# Patient Record
Sex: MALE | Race: WHITE | NOT HISPANIC OR LATINO | URBAN - METROPOLITAN AREA
[De-identification: names, ages, dates, MRNs, and addresses within clinical notes are randomized per-mention and may not be internally consistent; named-entity substitution may affect disease eponyms.]

---

## 2017-03-29 ENCOUNTER — EMERGENCY (EMERGENCY)
Facility: HOSPITAL | Age: 32
LOS: 1 days | Discharge: HOME | End: 2017-03-29
Admitting: EMERGENCY MEDICINE
Payer: MEDICAID

## 2017-03-29 VITALS
RESPIRATION RATE: 18 BRPM | TEMPERATURE: 98 F | OXYGEN SATURATION: 98 % | HEART RATE: 96 BPM | DIASTOLIC BLOOD PRESSURE: 86 MMHG | SYSTOLIC BLOOD PRESSURE: 137 MMHG
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
## 2017-03-29 DIAGNOSIS — F10.129 ALCOHOL ABUSE WITH INTOXICATION, UNSPECIFIED: ICD-10-CM
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
## 2017-03-29 DIAGNOSIS — R45.851 SUICIDAL IDEATIONS: ICD-10-CM

## 2017-03-29 DIAGNOSIS — S00.33XA CONTUSION OF NOSE, INITIAL ENCOUNTER: ICD-10-CM

## 2017-03-29 PROCEDURE — 70450 CT HEAD/BRAIN W/O DYE: CPT | Mod: 26

## 2017-03-29 PROCEDURE — 70450 CT HEAD/BRAIN W/O DYE: CPT

## 2017-03-29 PROCEDURE — 99284 EMERGENCY DEPT VISIT MOD MDM: CPT | Mod: 25

## 2017-03-29 PROCEDURE — 99285 EMERGENCY DEPT VISIT HI MDM: CPT | Mod: 25

## 2017-03-29 PROCEDURE — 93005 ELECTROCARDIOGRAM TRACING: CPT

## 2017-03-29 PROCEDURE — 70486 CT MAXILLOFACIAL W/O DYE: CPT | Mod: 26

## 2017-03-29 PROCEDURE — 93010 ELECTROCARDIOGRAM REPORT: CPT

## 2017-03-29 PROCEDURE — 70486 CT MAXILLOFACIAL W/O DYE: CPT

## 2017-03-29 NOTE — ED PROVIDER NOTE - PROGRESS NOTE DETAILS
Patient's friend, Ramiro Javed, [phone].  Here with patient.  Verifies that this is out of character for Benito.  When he came upon the scene, ~ 6:30pm, Mr. Dalton was on the floor banging his face against the floor.  patient endorses daily alcohol abuse; last drink was 4pm.  Patient's occupation is . pt appears clinically sober at this time, stating he does not remember saying that he wanted to hurt himself and does not remember hitting his head against the floor to cause his nasal fracture, stating 'what an idiot I was.' He reports he knows where he is and why he was brought here but does not remember his actions. will contact psych at this time with the significance of the concern of his friends, will consult. -Maryann Mitchell PA-C Patient sobering up well. AAOx3. Mother, father, sister at bedside, all of whom willing to take him home with them, if cleared by psyche. Cleared by psych for discharge. - Brayden Shahid, Resident

## 2017-03-29 NOTE — ED ADULT NURSE NOTE - OBJECTIVE STATEMENT
33 y/o M BIBA from home s/p breakup with girlfriend, as per EMS pt's girlfriend reports he has been drinking a bottle of vodka since yesterday, GF called EMS to their home when he was lying on the ground, sobbing, screaming, threatening to hurt himself and hitting his face against the ground. Pt was endorsing SI to EMS upon their arrival. Pt has small scab to nose, denies any pain. Pt presents AAOx3, NAD, sitting up comfortably in bed with friend at bedside, pt denies SI/HI/AV hallucinations at this time, pt calm/cooperative, intermittently tearful, pt wanded by security, in gown, belongings obtained by security, 1:1 in progress, safety maintained.

## 2017-03-29 NOTE — ED PROVIDER NOTE - NS ED ROS FT
Constitutional: No fever or chills  Eyes: No visual changes, eye pain or redness  HEENT: No throat pain, ear pain. +nasal swelling and bruising. No nose bleeding.  CV: No chest pain or lower extremity edema  Resp: No SOB no cough  GI: No abd pain. No nausea or vomiting. No diarrhea. No constipation.   : No dysuria, hematuria.   MSK: No musculoskeletal pain  Skin: No rash  Neuro: No headache. No numbness or tingling. No weakness.

## 2017-03-29 NOTE — ED PROVIDER NOTE - PHYSICAL EXAMINATION
patient awake alert NAD . +edema and developing ecchymosis to nose. LUNGS CTAB no wheeze no crackle. CARD RRR no m/r/g.  Abdomen soft NT ND no rebound no guarding no CVA tenderness. EXT WWP no edema no calf tenderness CV 2+DP/PT bilaterally. neuro A&Ox3 gait normal.  skin warm and dry no rash

## 2017-03-29 NOTE — ED ADULT NURSE NOTE - CHPI ED SYMPTOMS NEG
no pain/no chills/no fever/no disorientation/no nausea/no weakness/no confusion/no abdominal pain/no abdominal distension/no vomiting

## 2017-03-29 NOTE — ED PROVIDER NOTE - ATTENDING CONTRIBUTION TO CARE
MD Mckinney:  patient seen and evaluated with the PA.  I was present for key portions of the History and Physical, and I agree with the Impression and Plan.    MD Mckinney:  33 yo M, bib police after an altercation with GF.  She called police after an argument that ended with him expressing intent to hurt himself.  No specific plan.  Patient currently calm, cooperative, and remorseful.  Reports that he does not want to hurt himself.  Vs - wnl.  Physical Exam: adult M, tearful, +swollen nose, no septal hematoma.   Psyche:  denies SI/HI, no A/V hallucinations.  Ambulates w/o difficulty.  Here with best friend, who, if needed can provide transportation and a place for Benito to stay tonight.  Impression:  ETOH intox + SI, now denying, presently calm & cooperative.  Plan:  ecg, CT head/max/face, reassess.

## 2017-03-29 NOTE — ED PROVIDER NOTE - OBJECTIVE STATEMENT
31 y/o M BIB EMD/police after girlfriend called them during fight/patient intoxicated. Pt present with close friends who provide history as patient is intoxicated at this time. Report patient has been arguing with his girlfriend, 'likely going to break up' for the last 2 days. Pt has 'drank a bottle of vodka between yesterday and today and has been extremely intoxicated.' Girlfriend became concerned when during their argument he became very tearful, threw himself on the ground face down and was hitting his face into the floor.' Girlfriend called his friends stating that patient was saying he wanted to hurt himself and was too drunk for her to take care of. Pt states now that he does not want to hurt himself or others; no plan for hurting himself and has never felt the urge to hurt himself in the past. Denies any psych history, drug abuse, nausea, vomiting. hallucinations.

## 2017-03-29 NOTE — ED PROVIDER NOTE - MEDICAL DECISION MAKING DETAILS
MD Mckinney:  31 yo M, bib police after an altercation with GF.  She called police after an argument that ended with him expressing intent to hurt himself.  No specific plan.  Patient currently calm, cooperative, and remorseful.  Reports that he does not want to hurt himself.  Vs - wnl.  Physical Exam: adult M, tearful, +swollen nose, no septal hematoma.   Psyche:  denies SI/HI, no A/V hallucinations.  Ambulates w/o difficulty.  Here with best friend, who, if needed can provide transportation and a place for Benito to stay tonight.  Impression:  ETOH intox + SI, now denying, presently calm & cooperative.  Plan:  ecg, CT head/max/face, reassess.

## 2017-03-30 VITALS
OXYGEN SATURATION: 98 % | HEART RATE: 90 BPM | SYSTOLIC BLOOD PRESSURE: 109 MMHG | TEMPERATURE: 98 F | DIASTOLIC BLOOD PRESSURE: 71 MMHG | RESPIRATION RATE: 16 BRPM

## 2017-03-30 DIAGNOSIS — F19.94 OTHER PSYCHOACTIVE SUBSTANCE USE, UNSPECIFIED WITH PSYCHOACTIVE SUBSTANCE-INDUCED MOOD DISORDER: ICD-10-CM

## 2017-03-30 DIAGNOSIS — F10.99 ALCOHOL USE, UNSPECIFIED WITH UNSPECIFIED ALCOHOL-INDUCED DISORDER: ICD-10-CM

## 2017-03-30 DIAGNOSIS — R69 ILLNESS, UNSPECIFIED: ICD-10-CM

## 2017-03-30 NOTE — ED BEHAVIORAL HEALTH ASSESSMENT NOTE - DESCRIPTION
Calm, cooperative, maintaining behavioral control Denies Living with girlfriend, no children, has two jobs

## 2017-03-30 NOTE — ED BEHAVIORAL HEALTH ASSESSMENT NOTE - OTHER PAST PSYCHIATRIC HISTORY (INCLUDE DETAILS REGARDING ONSET, COURSE OF ILLNESS, INPATIENT/OUTPATIENT TREATMENT)
Denies past psychiatric history, inpatient admissions, suicide attempts, self-injurious behaviors. Denies past and current outpatient psychiatric follow up.

## 2017-03-30 NOTE — ED BEHAVIORAL HEALTH ASSESSMENT NOTE - SUMMARY
Benito Dalton is a 32 year old  man, single, with no dependents, living with girlfriend, working two jobs (construction/bartending), with no formal past psychiatric history, denies past suicide attempts/inpatient admissions, denies past and current psychiatric follow up, with no reported history of violence/arrests, with history of alcohol use resulting in blackouts, denies seizures/MICU admissions/DTs/DUIs, with no reported past medical history who was BIBEMS activated by girlfriend for patient banging head against floor, making suicidal statements while intoxicated earlier today. At the time of assessment, patient did not appear acutely intoxicated or in acute withdrawal. Patient currently denies suicidal and homicidal ideations, intent and plan with no evidence of self-injurious or violent behaviors in the ED. Presenting complaint in the context of alcohol intoxication also  exacerbated by psychosocial stressors with maladaptive coping skill, which he admits. There is no evidence of florid psychosis or ann. Collateral (girlfriend, parents and sister) all deny acute safety concerns, advocating for discharge. Patient offered voluntary admission, which he declined. He is not an imminent danger to self or others at this time and thus, does not meet criteria for involuntary psychiatric admission. Patient is psychiatrically stable for discharge. Case discussed with attending, Dr. Victoria Carcamo via telephone.

## 2017-03-30 NOTE — ED BEHAVIORAL HEALTH ASSESSMENT NOTE - REFERRAL / APPOINTMENT DETAILS
Patient offered referrals for substance abuse treatment/psychiatric treatment, however, he declined Patient offered referrals for substance abuse treatment/psychiatric treatment, however, he declined/ not accepting contact information.

## 2017-03-30 NOTE — ED BEHAVIORAL HEALTH ASSESSMENT NOTE - SAFETY PLAN DETAILS
Patient, girlfriend and family advised to call 911 or go to the ED if patient has worsening symptoms, reports suicidal/homicidal ideations, intent or plan.

## 2017-03-30 NOTE — ED BEHAVIORAL HEALTH ASSESSMENT NOTE - SUICIDE PROTECTIVE FACTORS
Engaged in work or school/Identifies reasons for living/Future oriented/Supportive social network or family/Responsibility to family and others

## 2017-03-30 NOTE — ED BEHAVIORAL HEALTH ASSESSMENT NOTE - OTHER
girlfriend, Talisha Jin girlfriend financial burdens, interpersonal conflict with girlfriend + alcohol odor sarcastic at times financial burdens, interpersonal conflict with girlfriend (per chart review)

## 2017-03-30 NOTE — ED BEHAVIORAL HEALTH ASSESSMENT NOTE - HPI (INCLUDE ILLNESS QUALITY, SEVERITY, DURATION, TIMING, CONTEXT, MODIFYING FACTORS, ASSOCIATED SIGNS AND SYMPTOMS)
Benito Dalton is a 32 year old  man, single, with no dependents, living with girlfriend, working two jobs (construction/bartending), with no formal past psychiatric history, denies past suicide attempts/inpatient admissions, denies past and current psychiatric follow up, with no reported history of violence/arrests, with history of alcohol use resulting in blackouts, denies seizures/MICU admissions/DTs/DUIs, with no reported past medical history who was BIBEMS activated by girlfriend for patient banging head against floor, making suicidal statements while intoxicated earlier today.    On evaluation, patient does not appear acutely intoxicated or in withdrawal. He reports that he has been "under a lot of stress" recently which has been causing him to drink alcohol more frequently. Patient reports main stressor of financial burdens/paying off student loans. He states that he drinks a few beers "occasionally" (unable to quantify use, frequency)  but reports drinking 1L of vodka earlier today which is reportedly a higher amount of alcohol consumption for him. Patient reports that he drinks to help him feel better and cope with stress. States that he does not recall events leading up to ED admission tonight. Reports that he was informed by his girlfriend that he was loud, laid on the floor, started banging head against the floor and made suicidal statements. Benito Dalton is a 32 year old  man, single, with no dependents, living with girlfriend, working two jobs (construction/bartending), with no formal past psychiatric history, denies past suicide attempts/inpatient admissions, denies past and current psychiatric follow up, with no reported history of violence/arrests, with history of alcohol use resulting in blackouts, denies seizures/MICU admissions/DTs/DUIs, with no reported past medical history who was BIBEMS activated by girlfriend for patient banging head against floor, making suicidal statements while intoxicated earlier today.    On evaluation, patient does not appear acutely intoxicated or in withdrawal. He reports that he has been "under a lot of stress" recently which has been causing him to drink alcohol more frequently. Patient reports main stressor of financial burdens/paying off student loans. He states that he drinks a few beers "occasionally" (unable to quantify use, frequency)  but reports drinking 1L of vodka earlier today which is reportedly a higher amount of alcohol consumption for him. Patient reports that he drinks to help him feel better and cope with stress, which he admits is maladaptive. States that he does not recall events leading up to ED admission tonight. Reports that he was informed by his girlfriend that he became tearful, laid on the floor, started banging head against the ground and stated that he wanted to hurt himself. Patient currently denies suicidal and homicidal ideations, intent and plan. He denies recent suicide attempt. Cites family as major protective factors. Also reports desire to get out of debt. Reports feeling comforted by presence of girlfriend, parents, sister and four best friends at bedside tonight. Patient reports "depressed" mood over the past two days, denies anhedonia, changes in sleep or appetite, impaired concentration, fatigue, hopelessness, inappropriate guilt. He denies anxiety and panic attacks. Denies past and current manic symptoms. Denies paranoia, auditory and visual hallucinations. No delusions elicited.     Collateral: Writer spoke with patient's girlfriend, Talisha Gutierrezon who corroborates above account. She states that the patient has been on an "alcohol binge" over the past two days due to reported stress. Reports that he drinks frequently but she had never seen him become self-injurious or violent before thus, prompting ED admission. Talisha denies patient making suicidal or homicidal statements prior to tonight when he was intoxicated. She denies past and current domestic abuse. States that she feels safe with the patient and denies acute safety concerns currently. Girlfriend, parents and sister  (present at bedside) all deny acute safety concerns,

## 2017-03-30 NOTE — ED BEHAVIORAL HEALTH ASSESSMENT NOTE - DETAILS
Girlfriend, Talisha Jin informed of plan for discharge, in agreement. Denies acute safety concerns. fractured nose, nasal swelling patient  making suicidal statements while intoxicated this evening, denies currently acute nasal fracture, nasal swelling

## 2017-03-30 NOTE — ED BEHAVIORAL HEALTH ASSESSMENT NOTE - DESCRIPTION (FIRST USE, LAST USE, QUANTITY, FREQUENCY, DURATION)
Alcohol use, reports last drink was 1L of vodka earlier this afternoon, reports "social" alcohol use several times daily, less than 1L vodka at a time

## 2017-03-30 NOTE — ED BEHAVIORAL HEALTH ASSESSMENT NOTE - CONSEQUENCES
Reports history of blackouts, last in college "years ago"; denies history of complicated withdrawals

## 2020-12-17 NOTE — ED BEHAVIORAL HEALTH ASSESSMENT NOTE - AXIS III
Prior to immunization administration, verified patients identity using patient s name and date of birth. Please see Immunization Activity for additional information.     Screening Questionnaire for Adult Immunization    Are you sick today?   No   Do you have allergies to medications, food, a vaccine component or latex?   No   Have you ever had a serious reaction after receiving a vaccination?   No   Do you have a long-term health problem with heart, lung, kidney, or metabolic disease (e.g., diabetes), asthma, a blood disorder, no spleen, complement component deficiency, a cochlear implant, or a spinal fluid leak?  Are you on long-term aspirin therapy?   Yes   Do you have cancer, leukemia, HIV/AIDS, or any other immune system problem?   No   Do you have a parent, brother, or sister with an immune system problem?   No   In the past 3 months, have you taken medications that affect  your immune system, such as prednisone, other steroids, or anticancer drugs; drugs for the treatment of rheumatoid arthritis, Crohn s disease, or psoriasis; or have you had radiation treatments?   No   Have you had a seizure, or a brain or other nervous system problem?   No   During the past year, have you received a transfusion of blood or blood    products, or been given immune (gamma) globulin or antiviral drug?   No   For women: Are you pregnant or is there a chance you could become       pregnant during the next month?   No   Have you received any vaccinations in the past 4 weeks?   No     Immunization questionnaire answers were all negative.        Per orders of Dr. Barba, injection of PPSV23 + Flu shoot  given by Jenifer Guzman CMA. Patient instructed to remain in clinic for 15 minutes afterwards, and to report any adverse reaction to me immediately.       Screening performed by Jenifer Guzman CMA on 12/17/2020 at 4:39 PM.     Denies